# Patient Record
(demographics unavailable — no encounter records)

---

## 2019-04-28 NOTE — RADIOLOGY REPORT (SQ)
EXAM DESCRIPTION:  MRI CERVICAL SPINE WITHOUT



COMPLETED DATE/TIME:  4/27/2019 9:34 am



REASON FOR STUDY:  RADICULOPATHY CERVICAL REGION M54.12 M54.12  RADICULOPATHY, CERVICAL REGION



COMPARISON:  None.



TECHNIQUE:  Sagittal and Axial imaging includes T1, T2, STIR and gradient echo sequences.



LIMITATIONS:  None.



FINDINGS:  ALIGNMENT: Normal.

VERTEBRAE: Intact.

BONE MARROW: Normal. No marrow replacement or reactive changes.

DISCS: Normal. No significant abnormal signal or loss of height.

HARDWARE: None in the spine.

CORD AND BASE OF BRAIN: Normal in size and signal intensity.

SOFT TISSUES: No soft tissue masses.

C1-C2: No significant spinal stenosis.

C2-C3: No significant spinal stenosis or exit foraminal stenosis.

C3-C4: No significant spinal stenosis or exit foraminal stenosis.

C4-C5: No significant spinal stenosis or exit foraminal stenosis.

C5-C6: Mild right uncovertebral spurring.  Mild right exit foraminal stenosis.  No significant spinal
 stenosis.

C6-C7: No significant spinal stenosis or exit foraminal stenosis.

C7-T1: No significant spinal stenosis or exit foraminal stenosis.

UPPER THORACIC: Incompletely imaged. No significant spinal stenosis or exit foraminal stenosis.

OTHER: No other significant finding.



IMPRESSION:  MILD RIGHT-SIDED UNCOVERTEBRAL SPURRING AT C5-C6 WITH MILD RIGHT EXIT FORAMINAL STENOSIS
.  OTHERWISE UNREMARKABLE STUDY.



TECHNICAL DOCUMENTATION:  JOB ID:  [id number]

 [employer]- All Rights Reserved



Reading location - IP/workstation name: [email or web address]